# Patient Record
Sex: FEMALE | Race: WHITE | NOT HISPANIC OR LATINO | URBAN - METROPOLITAN AREA
[De-identification: names, ages, dates, MRNs, and addresses within clinical notes are randomized per-mention and may not be internally consistent; named-entity substitution may affect disease eponyms.]

---

## 2017-03-03 ENCOUNTER — OFFICE (OUTPATIENT)
Dept: URBAN - METROPOLITAN AREA CLINIC 11 | Facility: CLINIC | Age: 42
End: 2017-03-03

## 2017-03-03 VITALS
HEIGHT: 64 IN | SYSTOLIC BLOOD PRESSURE: 150 MMHG | RESPIRATION RATE: 16 BRPM | HEART RATE: 89 BPM | DIASTOLIC BLOOD PRESSURE: 81 MMHG | WEIGHT: 293 LBS

## 2017-03-03 DIAGNOSIS — K52.89 OTHER SPECIFIED NONINFECTIVE GASTROENTERITIS AND COLITIS: ICD-10-CM

## 2017-03-03 DIAGNOSIS — R10.13 EPIGASTRIC PAIN: ICD-10-CM

## 2017-03-03 DIAGNOSIS — Z83.71 FAMILY HISTORY OF COLONIC POLYPS: ICD-10-CM

## 2017-03-03 PROCEDURE — 99204 OFFICE O/P NEW MOD 45 MIN: CPT | Performed by: INTERNAL MEDICINE

## 2017-03-03 RX ORDER — OMEPRAZOLE 40 MG/1
CAPSULE, DELAYED RELEASE ORAL
Qty: 0 | Refills: 0 | Status: COMPLETED
End: 2017-03-03

## 2017-03-03 RX ORDER — SODIUM PICOSULFATE, MAGNESIUM OXIDE, AND ANHYDROUS CITRIC ACID 10; 3.5; 12 MG/16.1G; G/16.1G; G/16.1G
POWDER, METERED ORAL
Qty: 1 | Refills: 0 | Status: ACTIVE
Start: 2017-03-03

## 2017-03-03 NOTE — SERVICENOTES
We dsicussed her symptoms and family history.  I would like to do the EGD to r/o ulcer disease and would have her limited her NSAIDS while she is getting her migraine eval completed. With the diarrhea, family history of IBD/colon polyps, and her bleeding/pain (likely hemorrhoids and possibly a fissure), I would do the colonoscopy as well.  I will stop the Prilosec due to the risks of headaches and I will change her to Dexilant.
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
## 2017-03-22 ENCOUNTER — AMBULATORY SURGICAL CENTER (OUTPATIENT)
Dept: URBAN - METROPOLITAN AREA SURGERY 3 | Facility: SURGERY | Age: 42
End: 2017-03-22

## 2017-03-22 ENCOUNTER — OFFICE (OUTPATIENT)
Dept: URBAN - METROPOLITAN AREA CLINIC 11 | Facility: CLINIC | Age: 42
End: 2017-03-22
Payer: COMMERCIAL

## 2017-03-22 VITALS
DIASTOLIC BLOOD PRESSURE: 59 MMHG | SYSTOLIC BLOOD PRESSURE: 108 MMHG | TEMPERATURE: 97.8 F | DIASTOLIC BLOOD PRESSURE: 68 MMHG | SYSTOLIC BLOOD PRESSURE: 119 MMHG | SYSTOLIC BLOOD PRESSURE: 104 MMHG | SYSTOLIC BLOOD PRESSURE: 112 MMHG | DIASTOLIC BLOOD PRESSURE: 57 MMHG | WEIGHT: 293 LBS | OXYGEN SATURATION: 96 % | DIASTOLIC BLOOD PRESSURE: 57 MMHG | RESPIRATION RATE: 18 BRPM | SYSTOLIC BLOOD PRESSURE: 108 MMHG | TEMPERATURE: 97.7 F | DIASTOLIC BLOOD PRESSURE: 58 MMHG | TEMPERATURE: 97.7 F | SYSTOLIC BLOOD PRESSURE: 106 MMHG | OXYGEN SATURATION: 97 % | TEMPERATURE: 97.8 F | HEART RATE: 85 BPM | DIASTOLIC BLOOD PRESSURE: 76 MMHG | HEART RATE: 87 BPM | OXYGEN SATURATION: 97 % | HEART RATE: 88 BPM | DIASTOLIC BLOOD PRESSURE: 59 MMHG | HEART RATE: 87 BPM | OXYGEN SATURATION: 98 % | SYSTOLIC BLOOD PRESSURE: 106 MMHG | HEART RATE: 85 BPM | OXYGEN SATURATION: 96 % | SYSTOLIC BLOOD PRESSURE: 119 MMHG | SYSTOLIC BLOOD PRESSURE: 104 MMHG | WEIGHT: 293 LBS | RESPIRATION RATE: 20 BRPM | RESPIRATION RATE: 20 BRPM | HEIGHT: 64 IN | SYSTOLIC BLOOD PRESSURE: 112 MMHG | HEIGHT: 64 IN | HEART RATE: 88 BPM | RESPIRATION RATE: 18 BRPM | OXYGEN SATURATION: 98 % | DIASTOLIC BLOOD PRESSURE: 68 MMHG | DIASTOLIC BLOOD PRESSURE: 76 MMHG | DIASTOLIC BLOOD PRESSURE: 58 MMHG

## 2017-03-22 DIAGNOSIS — R10.13 EPIGASTRIC PAIN: ICD-10-CM

## 2017-03-22 DIAGNOSIS — Z83.71 FAMILY HISTORY OF COLONIC POLYPS: ICD-10-CM

## 2017-03-22 DIAGNOSIS — R19.7 DIARRHEA, UNSPECIFIED: ICD-10-CM

## 2017-03-22 DIAGNOSIS — K64.9 UNSPECIFIED HEMORRHOIDS: ICD-10-CM

## 2017-03-22 PROBLEM — K29.70 GASTRITIS, UNSPECIFIED, WITHOUT BLEEDING: Status: ACTIVE | Noted: 2017-03-22

## 2017-03-22 PROBLEM — Z12.11 SCREENING FOR COLONIC NEOPLASIA: Status: ACTIVE | Noted: 2017-03-22

## 2017-03-22 PROCEDURE — 45380 COLONOSCOPY AND BIOPSY: CPT | Performed by: INTERNAL MEDICINE

## 2017-03-22 PROCEDURE — 88305 TISSUE EXAM BY PATHOLOGIST: CPT | Performed by: INTERNAL MEDICINE

## 2017-03-22 PROCEDURE — 43239 EGD BIOPSY SINGLE/MULTIPLE: CPT | Performed by: INTERNAL MEDICINE

## 2017-03-22 PROCEDURE — 88313 SPECIAL STAINS GROUP 2: CPT | Performed by: INTERNAL MEDICINE

## 2017-03-22 PROCEDURE — 88342 IMHCHEM/IMCYTCHM 1ST ANTB: CPT | Performed by: INTERNAL MEDICINE

## 2017-06-02 ENCOUNTER — OFFICE (OUTPATIENT)
Dept: URBAN - METROPOLITAN AREA CLINIC 11 | Facility: CLINIC | Age: 42
End: 2017-06-02

## 2017-06-02 VITALS
HEIGHT: 64 IN | DIASTOLIC BLOOD PRESSURE: 97 MMHG | HEART RATE: 98 BPM | SYSTOLIC BLOOD PRESSURE: 148 MMHG | WEIGHT: 293 LBS

## 2017-06-02 DIAGNOSIS — K58.9 IRRITABLE BOWEL SYNDROME WITHOUT DIARRHEA: ICD-10-CM

## 2017-06-02 PROCEDURE — 99213 OFFICE O/P EST LOW 20 MIN: CPT | Performed by: INTERNAL MEDICINE

## 2017-06-02 RX ORDER — RANITIDINE 300 MG/1
600 TABLET ORAL
Qty: 60 | Refills: 1 | Status: COMPLETED
Start: 2017-04-03 | End: 2017-06-02

## 2017-06-02 RX ORDER — CETIRIZINE HYDROCHLORIDE AND PSEUDOEPHEDRINE HYDROCHLORIDE 5; 120 MG/1; MG/1
10 TABLET, FILM COATED, EXTENDED RELEASE ORAL
Qty: 0 | Refills: 0 | Status: COMPLETED
End: 2017-06-02

## 2017-06-02 NOTE — SERVICEHPINOTES
She has been noted to have a mastocytic colitis and was started on Zyrtec and Zantac. She has noted that she his issues with lower abdominal gas pressure/bloating with alternating diarrhea, constipation, and normal stools.  She may have 2-3 days without a stool (with a sense of needing to go with bloating and pressure) followed by 3-4 days of normal stools, and then she may have four loose stools with mucus for a day.

## 2017-06-02 NOTE — SERVICENOTES
We discussed the dx of mastocytic colitis and her meds.  At this point, with the lack of persistent diarrhea, this may fit better with IBS.  I would stop her meds and discussed the use of Xyal for her allergic issues (voiding the constant cycles of pseudoephedrine).  I would start her on a daily fiber supplement with the addition of IBgard.